# Patient Record
Sex: MALE | Race: WHITE | NOT HISPANIC OR LATINO | URBAN - METROPOLITAN AREA
[De-identification: names, ages, dates, MRNs, and addresses within clinical notes are randomized per-mention and may not be internally consistent; named-entity substitution may affect disease eponyms.]

---

## 2024-09-30 ENCOUNTER — EMERGENCY (EMERGENCY)
Facility: HOSPITAL | Age: 24
LOS: 1 days | Discharge: HOME | End: 2024-09-30
Attending: EMERGENCY MEDICINE
Payer: COMMERCIAL

## 2024-09-30 VITALS
HEIGHT: 66 IN | WEIGHT: 149.91 LBS | TEMPERATURE: 100 F | DIASTOLIC BLOOD PRESSURE: 88 MMHG | HEART RATE: 67 BPM | RESPIRATION RATE: 17 BRPM | SYSTOLIC BLOOD PRESSURE: 121 MMHG | OXYGEN SATURATION: 98 %

## 2024-09-30 PROCEDURE — 90675 RABIES VACCINE IM: CPT

## 2024-09-30 PROCEDURE — 99284 EMERGENCY DEPT VISIT MOD MDM: CPT

## 2024-09-30 PROCEDURE — 90377 RABIES IG HT&SOL HUMAN IM/SC: CPT

## 2024-09-30 PROCEDURE — 96372 THER/PROPH/DIAG INJ SC/IM: CPT

## 2024-09-30 PROCEDURE — 90471 IMMUNIZATION ADMIN: CPT

## 2024-09-30 PROCEDURE — 99283 EMERGENCY DEPT VISIT LOW MDM: CPT | Mod: 25

## 2024-09-30 RX ORDER — RABIES IMMUNE GLOBULIN (HUMAN) 300 [IU]/ML
1350 INJECTION, SOLUTION INFILTRATION; INTRAMUSCULAR ONCE
Refills: 0 | Status: COMPLETED | OUTPATIENT
Start: 2024-09-30 | End: 2024-09-30

## 2024-09-30 RX ORDER — RABIES VIRUS STRAIN PM-1503-3M ANTIGEN (PROPIOLACTONE INACTIVATED) AND WATER 2.5 UNIT
1 KIT INTRAMUSCULAR ONCE
Refills: 0 | Status: COMPLETED | OUTPATIENT
Start: 2024-09-30 | End: 2024-09-30

## 2024-09-30 RX ADMIN — RABIES IMMUNE GLOBULIN (HUMAN) 1350 UNIT(S): 300 INJECTION, SOLUTION INFILTRATION; INTRAMUSCULAR at 19:24

## 2024-09-30 RX ADMIN — RABIES VIRUS STRAIN PM-1503-3M ANTIGEN (PROPIOLACTONE INACTIVATED) AND WATER 1 MILLILITER(S): KIT at 19:21

## 2024-09-30 NOTE — ED ADULT NURSE NOTE - OBJECTIVE STATEMENT
Patient presented to ED with dog bite on his right lateral lower leg. Patient was sent by [facility] for rabies vaccine.

## 2024-09-30 NOTE — ED PROVIDER NOTE - OBJECTIVE STATEMENT
24-year-old male, no significant past medical history, presents for evaluation status post dog bite to the right thigh earlier today.  While walking in the street in order was passing with the leashed dog that bit him once on the right thigh.   The pants did not rip.  Patient continued to go home and at home noticed that he had a laceration to the right thigh.  Patient unable to get in touch with the dog owner but reports the dog.  Healthy and well-kept.   Last tetanus immunization up-to-date.  Went to urgent care and was started on Augmentin and was told to come to the ER to get rabies postexposure prophylaxis.

## 2024-09-30 NOTE — ED PROVIDER NOTE - CLINICAL SUMMARY MEDICAL DECISION MAKING FREE TEXT BOX
24-year-old male, referred by urgent care for rabies postexposure prophylaxis.  There was no contact between the saliva of the dog and the skin because the pants did not rip.  Also dog is not a stray dog and well-appearing.  Explained to patient that the risk of rabies is nonexistent.  Patient still wants to get vaccinated.  Will start postexposure prophylaxis.

## 2024-09-30 NOTE — ED PROVIDER NOTE - PATIENT PORTAL LINK FT
You can access the FollowMyHealth Patient Portal offered by [facility] by registering at the following website: [email or web address]. By joining [facility]’s FollowMyHealth portal, you will also be able to view your health information using other applications (apps) compatible with our system.

## 2024-09-30 NOTE — ED ADULT NURSE NOTE - NSFALLUNIVINTERV_ED_ALL_ED
Bed/Stretcher in lowest position, wheels locked, appropriate side rails in place/Call bell, personal items and telephone in reach/Instruct patient to call for assistance before getting out of bed/chair/stretcher/Non-slip footwear applied when patient is off stretcher/[place] to call system/Physically safe environment - no spills, clutter or unnecessary equipment/Purposeful proactive rounding/Room/bathroom lighting operational, light cord in reach

## 2024-09-30 NOTE — ED PROVIDER NOTE - NSFOLLOWUPINSTRUCTIONS_ED_ALL_ED_FT
Return to the emergency room on the following dates to complete your rabies vaccination series:    2nd shot:: 10/3  3 rd shot: 10/07  4th shot: 10/14    If you experience any new or worsening symptoms or if you are concerned you can always come back to the emergency for a re-evaluation.  Some results may not be available at the time of your discharge from the hospital. You can download the FOLLOW MY HEALTH wade and have access to these results.  If there were any prescriptions given to you during the visit today take them as prescribed. If you have any questions you can ask the pharmacist.

## 2024-09-30 NOTE — ED PROVIDER NOTE - ATTENDING APP SHARED VISIT CONTRIBUTION OF CARE
I was physically present for the E/M service provided. I agree with above history, physical, and plan which I have reviewed and edited where appropriate. I was physically present for the key portions of the service provided.    Ivonne: requesting rabies. no risk for rabies based on CDC history. no need for IVIG

## 2024-11-04 NOTE — ED ADULT TRIAGE NOTE - CCCP TRG CHIEF CMPLNT
November 4, 2024      [facility]  [address] [place] [id number]  Phone: [phone]  Fax: [phone]       Patient: Akosua Hutchins   YOB: 1993  Date of Visit: 11/04/2024    To Whom It May Concern:    Aftab Hutchins  was at [facility] on 11/04/2024. The patient may return to work on 11/5/24 with no restrictions. If you have any questions or concerns, or if I can be of further assistance, please do not hesitate to contact me.    Sincerely,  MD Sheba Veliz MA     
Rabies vaccine s/p dog bite